# Patient Record
Sex: FEMALE | Race: WHITE
[De-identification: names, ages, dates, MRNs, and addresses within clinical notes are randomized per-mention and may not be internally consistent; named-entity substitution may affect disease eponyms.]

---

## 2018-07-12 ENCOUNTER — HOSPITAL ENCOUNTER (OUTPATIENT)
Dept: HOSPITAL 47 - LABPAT | Age: 58
Discharge: HOME | End: 2018-07-12
Attending: ORTHOPAEDIC SURGERY
Payer: COMMERCIAL

## 2018-07-12 DIAGNOSIS — Z01.812: Primary | ICD-10-CM

## 2018-07-12 LAB
ALBUMIN SERPL-MCNC: 4.4 G/DL (ref 3.5–5)
ALP SERPL-CCNC: 70 U/L (ref 38–126)
ALT SERPL-CCNC: 27 U/L (ref 9–52)
ANION GAP SERPL CALC-SCNC: 9 MMOL/L
APTT BLD: 22.7 SEC (ref 22–30)
AST SERPL-CCNC: 20 U/L (ref 14–36)
BUN SERPL-SCNC: 27 MG/DL (ref 7–17)
CALCIUM SPEC-MCNC: 10.1 MG/DL (ref 8.4–10.2)
CHLORIDE SERPL-SCNC: 106 MMOL/L (ref 98–107)
CO2 SERPL-SCNC: 27 MMOL/L (ref 22–30)
ERYTHROCYTE [DISTWIDTH] IN BLOOD BY AUTOMATED COUNT: 4.31 M/UL (ref 3.8–5.4)
ERYTHROCYTE [DISTWIDTH] IN BLOOD: 14.5 % (ref 11.5–15.5)
GLUCOSE SERPL-MCNC: 100 MG/DL (ref 74–99)
HCT VFR BLD AUTO: 40.7 % (ref 34–46)
HGB BLD-MCNC: 13.5 GM/DL (ref 11.4–16)
HYALINE CASTS UR QL AUTO: 1 /LPF (ref 0–2)
INR PPP: 1 (ref ?–1.2)
MCH RBC QN AUTO: 31.3 PG (ref 25–35)
MCHC RBC AUTO-ENTMCNC: 33.1 G/DL (ref 31–37)
MCV RBC AUTO: 94.4 FL (ref 80–100)
PH UR: 6 [PH] (ref 5–8)
PLATELET # BLD AUTO: 368 K/UL (ref 150–450)
POTASSIUM SERPL-SCNC: 5 MMOL/L (ref 3.5–5.1)
PROT SERPL-MCNC: 7.1 G/DL (ref 6.3–8.2)
PT BLD: 9.8 SEC (ref 9–12)
RBC UR QL: 1 /HPF (ref 0–5)
SODIUM SERPL-SCNC: 142 MMOL/L (ref 137–145)
SP GR UR: 1.02 (ref 1–1.03)
SQUAMOUS UR QL AUTO: 4 /HPF (ref 0–4)
UROBILINOGEN UR QL STRIP: <2 MG/DL (ref ?–2)
WBC # BLD AUTO: 8 K/UL (ref 3.8–10.6)
WBC #/AREA URNS HPF: 1 /HPF (ref 0–5)

## 2018-07-12 PROCEDURE — 87070 CULTURE OTHR SPECIMN AEROBIC: CPT

## 2018-07-12 PROCEDURE — 81001 URINALYSIS AUTO W/SCOPE: CPT

## 2018-07-12 PROCEDURE — 36415 COLL VENOUS BLD VENIPUNCTURE: CPT

## 2018-07-12 PROCEDURE — 85730 THROMBOPLASTIN TIME PARTIAL: CPT

## 2018-07-12 PROCEDURE — 85610 PROTHROMBIN TIME: CPT

## 2018-07-12 PROCEDURE — 85027 COMPLETE CBC AUTOMATED: CPT

## 2018-07-12 PROCEDURE — 80053 COMPREHEN METABOLIC PANEL: CPT

## 2018-07-24 ENCOUNTER — HOSPITAL ENCOUNTER (INPATIENT)
Dept: HOSPITAL 47 - 2ORMAIN | Age: 58
LOS: 1 days | Discharge: HOME HEALTH | DRG: 488 | End: 2018-07-25
Attending: ORTHOPAEDIC SURGERY | Admitting: ORTHOPAEDIC SURGERY
Payer: COMMERCIAL

## 2018-07-24 VITALS — BODY MASS INDEX: 37.6 KG/M2

## 2018-07-24 DIAGNOSIS — Z87.891: ICD-10-CM

## 2018-07-24 DIAGNOSIS — G89.29: ICD-10-CM

## 2018-07-24 DIAGNOSIS — T84.023A: ICD-10-CM

## 2018-07-24 DIAGNOSIS — K21.9: ICD-10-CM

## 2018-07-24 DIAGNOSIS — Z87.440: ICD-10-CM

## 2018-07-24 DIAGNOSIS — E55.9: ICD-10-CM

## 2018-07-24 DIAGNOSIS — M19.91: ICD-10-CM

## 2018-07-24 DIAGNOSIS — I10: ICD-10-CM

## 2018-07-24 DIAGNOSIS — Z79.899: ICD-10-CM

## 2018-07-24 DIAGNOSIS — Z80.6: ICD-10-CM

## 2018-07-24 DIAGNOSIS — F11.20: ICD-10-CM

## 2018-07-24 DIAGNOSIS — F41.1: ICD-10-CM

## 2018-07-24 DIAGNOSIS — Z96.651: ICD-10-CM

## 2018-07-24 DIAGNOSIS — M54.5: ICD-10-CM

## 2018-07-24 DIAGNOSIS — T84.063A: Primary | ICD-10-CM

## 2018-07-24 DIAGNOSIS — E78.5: ICD-10-CM
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
## 2018-07-24 DIAGNOSIS — K59.00: ICD-10-CM

## 2018-07-24 DIAGNOSIS — E66.01: ICD-10-CM

## 2018-07-24 PROCEDURE — 0SPD09Z REMOVAL OF LINER FROM LEFT KNEE JOINT, OPEN APPROACH: ICD-10-PCS

## 2018-07-24 PROCEDURE — 87070 CULTURE OTHR SPECIMN AEROBIC: CPT

## 2018-07-24 PROCEDURE — 87075 CULTR BACTERIA EXCEPT BLOOD: CPT

## 2018-07-24 PROCEDURE — 0SUW09Z SUPPLEMENT LEFT KNEE JOINT, TIBIAL SURFACE WITH LINER, OPEN APPROACH: ICD-10-PCS

## 2018-07-24 PROCEDURE — 85025 COMPLETE CBC W/AUTO DIFF WBC: CPT

## 2018-07-24 PROCEDURE — 87205 SMEAR GRAM STAIN: CPT

## 2018-07-24 RX ADMIN — POTASSIUM CHLORIDE SCH MLS: 14.9 INJECTION, SOLUTION INTRAVENOUS at 10:35

## 2018-07-24 RX ADMIN — ASPIRIN 325 MG ORAL TABLET SCH MG: 325 PILL ORAL at 20:21

## 2018-07-24 RX ADMIN — HYDROMORPHONE HYDROCHLORIDE PRN MG: 1 INJECTION, SOLUTION INTRAMUSCULAR; INTRAVENOUS; SUBCUTANEOUS at 13:45

## 2018-07-24 RX ADMIN — HYDROMORPHONE HYDROCHLORIDE PRN MG: 1 INJECTION, SOLUTION INTRAMUSCULAR; INTRAVENOUS; SUBCUTANEOUS at 20:22

## 2018-07-24 RX ADMIN — VARENICLINE SCH: 0.5 TABLET, FILM COATED ORAL at 17:17

## 2018-07-24 RX ADMIN — HYDROMORPHONE HYDROCHLORIDE PRN MG: 1 INJECTION, SOLUTION INTRAMUSCULAR; INTRAVENOUS; SUBCUTANEOUS at 14:04

## 2018-07-24 RX ADMIN — HYDROCODONE BITARTRATE AND ACETAMINOPHEN PRN EACH: 5; 325 TABLET ORAL at 15:27

## 2018-07-24 RX ADMIN — FENTANYL CITRATE ONE MCG: 50 INJECTION, SOLUTION INTRAMUSCULAR; INTRAVENOUS at 14:13

## 2018-07-24 RX ADMIN — FENTANYL CITRATE ONE MCG: 50 INJECTION, SOLUTION INTRAMUSCULAR; INTRAVENOUS at 14:14

## 2018-07-24 RX ADMIN — HYDROMORPHONE HYDROCHLORIDE PRN MG: 1 INJECTION, SOLUTION INTRAMUSCULAR; INTRAVENOUS; SUBCUTANEOUS at 17:13

## 2018-07-24 RX ADMIN — CEFAZOLIN SCH: 330 INJECTION, POWDER, FOR SOLUTION INTRAMUSCULAR; INTRAVENOUS at 15:39

## 2018-07-24 RX ADMIN — FENTANYL CITRATE ONE MCG: 50 INJECTION, SOLUTION INTRAMUSCULAR; INTRAVENOUS at 13:58

## 2018-07-24 RX ADMIN — POTASSIUM CHLORIDE SCH: 14.9 INJECTION, SOLUTION INTRAVENOUS at 16:31

## 2018-07-24 RX ADMIN — HYDROMORPHONE HYDROCHLORIDE PRN MG: 1 INJECTION, SOLUTION INTRAMUSCULAR; INTRAVENOUS; SUBCUTANEOUS at 13:37

## 2018-07-24 NOTE — P.ONQ
Anesthesiology Proc Note - PNB





- Peripheral Nerve Block Performed


  ** Left Adductor Canal Infusion


Time Out Performed: Yes (7210)


Procedure Start Time: 10:50


Procedure Stop Time: 11:05


Indication: Acute Post-Operative Pain, Dx/Pain Location (Left knee pain), 

Requested by physician


Sedation Type: Sedate with meaningful contact maintained


Preparation: Sterile Prep


Position: Supine


Catheter: Indwelling


Needle Types: Larkymarii


Needle Size: 100mm (4")


Needle Gauge: 21


Technique: Ultrasound


Injectate: 0.5% Ropivacaine (see comment for volume)


Blood Aspirated: No


Pain Paresthesia on Injection Noted: No


Resistance on Injection: Normal


Events: Uneventful and Well Tolerated

## 2018-07-24 NOTE — P.OP
Date of Procedure: 07/24/18


Preoperative Diagnosis: 


Polyethylene wear left total knee arthroplasty


Postoperative Diagnosis: 


Polyethylene wear left total knee arthroplasty


Procedure(s) Performed: 


Polyethylene exchange, revision left total knee arthroplasty


Implants: 


Yefri NexGen polyethylene, size green, 17 mm, cruciate retaining


Anesthesia: spinal


Surgeon: Catrachito Lee


Assistant #1: Natalia Woodson


Estimated Blood Loss (ml): 50


Pathology: other (Cultures 2)


Condition: stable


Disposition: PACU


Indications for Procedure: 


This is a 58-year-old female has had a left total knee arthroplasty performed 

approximately 15 years ago.  She has exam findings and x-ray findings 

consistent with polyethylene wear of her left total knee arthroplasty.  After 

discussing the surgical and nonsurgical treatment options with her at length, I 

recommended a revision left total knee arthroplasty with polyethylene exchange, 

possible complete revision.  Informed consent was obtained.


Operative Findings: 


The operative findings are consistent with severe polyethylene wear and 

instability of the left total knee arthroplasty


Description of Procedure: 


Patient was seen in the preoperative area consent was reviewed and operative 

site was marked with a skin marker.  An adductor canal pain catheter was placed 

by anesthesia in the preoperative area.  Patient was then brought to the 

operating room and given preoperative antibiotics intravenously. A spinal 

anesthetic was administered by the anesthesia department.  A tourniquet was 

placed on the upper thigh and the lower extremity was prepped and draped in 

usual sterile fashion.  A gram of transexamic acid was given.  A universal 

timeout was then performed which confirmed the patient's name, surgical site, 

ALLERGIES, and consent.





The lower extremity was then exsanguinated and tourniquet was inflated to 250 

mmHg.  A standard and anterior midline approach to the knee was performed.  The 

skin and subcutaneous tissue was dissected down to the patellar tendon, with 

the scar being excised.  A medial parapatellar arthrotomy was then performed.  

A large amount of clear fluid was encountered, and this was cultured 2.  The 

knee was then extended, the patellar was everted, and the knee was again 

flexed.  Scar tissue was excised from about the knee, and then the knee was 

then examined.





There is found to be significant medial instability on full extension and 

flexion to 90.  The polyethylene component was easily removed with a hemostat.

  The polyethylene component was inspected and found to have significant 

posterior wear.  The femoral, tibial, and patellar components were then 

inspected and found to be well fixed.





A 17 mm trial was then placed, which was one size up from the 14 mm component 

that was already in place.  The knee was then able to fully extend and flex to 

115, and instability was then gone.  The trial was then removed.





A Jonathan was then used to remove any reactive synovium.  The soft tissues were 

then injected with a ropivacaine solution, which consisted of 246.25 mg of 

ropivacaine, 0.5 mg of epinephrine, 30 mg of Toradol, 80 g of clonidine, and 

48.45 mL of sterile water, for a total of 100 mL of fluid injected. After the 

cemented hardened.  The tourniquet was released, and hemostasis was obtained.  

A second gram of transexamic acid was given.  The knee was again irrigated.  

The knee was again taken through range of motion and found to be stable 

throughout all range of motion of 0-130, and the patella tracked normally.  

The fascia was then closed with #2 strata fix suture.  The subcutaneous tissue 

was closed with 3-0 Vicryl and 3-0 strata fix.  Dermabond glue was used for the 

skin and placed with the knee in flexion. The patient was placed in a sterile 

silver dressing.  Patient was then transferred to recovery room in stable 

condition.





The assistant CAITLYN Forde was required due the complexity surgery and the 

need for a skilled surgical assistant.  She assisted in positioning, draping, 

retraction, and closure of the wound.

## 2018-07-24 NOTE — XR
EXAMINATION TYPE: XR knee limited LT

 

DATE OF EXAM: 7/24/2018

 

COMPARISON: NONE

 

TECHNIQUE: Two views submitted

 

HISTORY: Post op

 

FINDINGS:

There is a prosthetic  knee in near anatomic alignment.  There is soft tissue edema and emphysema. 

 

IMPRESSION:

1. Postoperative change.  Appears in near-anatomic alignment

## 2018-07-25 VITALS — RESPIRATION RATE: 16 BRPM

## 2018-07-25 VITALS — DIASTOLIC BLOOD PRESSURE: 67 MMHG | SYSTOLIC BLOOD PRESSURE: 117 MMHG | TEMPERATURE: 97.8 F

## 2018-07-25 VITALS — HEART RATE: 82 BPM

## 2018-07-25 LAB
BASOPHILS # BLD AUTO: 0 K/UL (ref 0–0.2)
BASOPHILS NFR BLD AUTO: 0 %
EOSINOPHIL # BLD AUTO: 0 K/UL (ref 0–0.7)
EOSINOPHIL NFR BLD AUTO: 0 %
ERYTHROCYTE [DISTWIDTH] IN BLOOD BY AUTOMATED COUNT: 3.83 M/UL (ref 3.8–5.4)
ERYTHROCYTE [DISTWIDTH] IN BLOOD: 14.1 % (ref 11.5–15.5)
HCT VFR BLD AUTO: 36 % (ref 34–46)
HGB BLD-MCNC: 11.3 GM/DL (ref 11.4–16)
LYMPHOCYTES # SPEC AUTO: 1.2 K/UL (ref 1–4.8)
LYMPHOCYTES NFR SPEC AUTO: 10 %
MCH RBC QN AUTO: 29.4 PG (ref 25–35)
MCHC RBC AUTO-ENTMCNC: 31.3 G/DL (ref 31–37)
MCV RBC AUTO: 94.1 FL (ref 80–100)
MONOCYTES # BLD AUTO: 0.5 K/UL (ref 0–1)
MONOCYTES NFR BLD AUTO: 5 %
NEUTROPHILS # BLD AUTO: 9.6 K/UL (ref 1.3–7.7)
NEUTROPHILS NFR BLD AUTO: 84 %
PLATELET # BLD AUTO: 385 K/UL (ref 150–450)
WBC # BLD AUTO: 11.4 K/UL (ref 3.8–10.6)

## 2018-07-25 RX ADMIN — HYDROCODONE BITARTRATE AND ACETAMINOPHEN PRN EACH: 5; 325 TABLET ORAL at 00:39

## 2018-07-25 RX ADMIN — ASPIRIN 325 MG ORAL TABLET SCH MG: 325 PILL ORAL at 09:39

## 2018-07-25 RX ADMIN — VARENICLINE SCH MG: 0.5 TABLET, FILM COATED ORAL at 09:39

## 2018-07-25 RX ADMIN — CEFAZOLIN SCH: 330 INJECTION, POWDER, FOR SOLUTION INTRAMUSCULAR; INTRAVENOUS at 04:29

## 2018-07-25 RX ADMIN — HYDROMORPHONE HYDROCHLORIDE PRN MG: 1 INJECTION, SOLUTION INTRAMUSCULAR; INTRAVENOUS; SUBCUTANEOUS at 05:36

## 2018-07-25 RX ADMIN — HYDROCODONE BITARTRATE AND ACETAMINOPHEN PRN EACH: 5; 325 TABLET ORAL at 06:53

## 2018-07-25 NOTE — P.DS
Providers


Date of admission: 


07/24/18 08:53





Expected date of discharge: 07/25/18


Attending physician: 


Catrachito Lee





Consults: 





 





07/24/18 11:28


Consult Physician Routine 


   Consulting Provider: Tabitha Aguiar


   Consult Reason/Comments: medical management


   Do you want consulting provider notified?: Yes





07/24/18 16:20


Consult Physician Routine 


   Consulting Provider: Basim Stewart


   Consult Reason/Comments: medical management


   Do you want consulting provider notified?: Yes











Primary care physician: 


Tabitha Aguair








- Discharge Diagnosis(es)


(1) Failed total left knee replacement


Current Visit: Yes   Status: Acute   





(2) Status post revision of total replacement of left knee


Current Visit: Yes   Status: Acute   


Hospital Course: 


This is a 58-year-old female with known history of polyethylene wear of her 

left total knee arthroplasty that was done ~15 years ago.  The patient presents 

for evaluation.  After discussion and consideration patient elects to proceed 

with revision left total knee arthroplasty.  The patient is seen preoperatively 

by Dr. Lee and medically cleared for surgery by their primary care 

physician.





Patient is admitted to [facility] on 07/24/2018 for revision left 

total knee arthroplasty.  The procedures performed without complication or 

sequelae.  The patient is doing well postoperatively.  Labs and vital signs are 

stable on day of discharge.





On day of discharge patient's knee incision is healing well.  There is minimal 

erythema.  There is no drainage noted at this time.  There is minimal soft 

tissue swelling to the knee.  Patient has full foot and ankle motion without 

difficulty or pain.  Neurovascular status to the left lower extremity is 

intact.  Patient is discharged home in good condition. Please see med rec for 

accurate list of home medications.








Plan - Discharge Summary


Discharge Rx Participant: Yes


New Discharge Prescriptions: 


New


   Aspirin 325 mg PO BID #60 tab


   HYDROcodone/APAP 5-325MG [Norco 5-325] 1 - 2 tab PO Q4-6H PRN #84 tab


     PRN Reason: Pain


   Sennosides [Senokot] 1 tab PO BID #60 tablet





No Action


   amLODIPine [Norvasc] 10 mg PO DAILY


   Varenicline [Chantix Starter Pack] 0.5 mg PO DAILY


   Omeprazole [PriLOSEC] 20 mg PO AC-BRKFST


   HYDROcodone/APAP 5-325MG [Norco 5-325] 1 tab PO Q6HR PRN


     PRN Reason: Pain


   ALPRAZolam [Xanax] 0.5 mg PO DAILY PRN


     PRN Reason: Anxiety


   Simvastatin [Zocor] 20 mg PO DAILY


   Ibuprofen [Motrin] 800 mg PO TID PRN


     PRN Reason: Pain


Discharge Medication List





ALPRAZolam [Xanax] 0.5 mg PO DAILY PRN 07/17/18 [History]


HYDROcodone/APAP 5-325MG [Norco 5-325] 1 tab PO Q6HR PRN 07/17/18 [History]


Ibuprofen [Motrin] 800 mg PO TID PRN 07/17/18 [History]


Omeprazole [PriLOSEC] 20 mg PO AC-BRKFST 07/17/18 [History]


Simvastatin [Zocor] 20 mg PO DAILY 07/17/18 [History]


Varenicline [Chantix Starter Pack] 0.5 mg PO DAILY 07/17/18 [History]


amLODIPine [Norvasc] 10 mg PO DAILY 07/17/18 [History]


Aspirin 325 mg PO BID #60 tab 07/25/18 [Rx]


HYDROcodone/APAP 5-325MG [Norco 5-325] 1 - 2 tab PO Q4-6H PRN #84 tab 07/25/18 [

Rx]


Sennosides [Senokot] 1 tab PO BID #60 tablet 07/25/18 [Rx]








Follow up Appointment(s)/Referral(s): 


Catrachito Lee DO [Doctor of Osteopathic Medicine] - 2 Weeks


Ambulatory/Diagnostic Orders: 


Continuous Passive Motion (CPM) Machine [DME.AMB1] Time Frame: 3 Weeks, Location

: None Selected


Activity/Diet/Wound Care/Special Instructions: 


Weightbearing as tolerated with a walker


CPM 5-6h daily


Leave dressing intact.  May be removed by home care nurse in 10 days.


May shower with dressing on.


Please call [facility] with any questions or concerns, [phone]





Discharge Disposition: HOME WITH HOME HEALTH SERVICES

## 2018-07-25 NOTE — P.PN
Progress Note - Text


Progress Note Date: 07/25/18


The patient is doing well status post total knee replacement.  Pain is well 

controlled by a combination of local anesthetic infusion through the adductor 

canal catheter and oral analgesics.  There are no signs of infection around the 

catheter skin entry site.


The local anesthetic infusion will be continued as per protocol.

## 2018-07-25 NOTE — PN
PROGRESS NOTE



DATE OF SERVICE:

07/25/2018



INTERVAL HISTORY:

This is a 58-year-old woman who was admitted after revision of the left total knee

arthroplasty, is improving significantly.  No chest pain.  No palpitations.  No fever.



PHYSICAL EXAM:

Alert and oriented x3. Pulse 52, blood pressure 170/67, respirations 16, temperature

97.8, pulse ox 98% on room air.

HEENT:  Conjunctivae normal.

NECK:  No jugular venous distention.

CARDIOVASCULAR:  S1, S2, muffled.

RESPIRATORY:  Breath sounds diminished at the bases, no rhonchi, no crackles.

ABDOMEN:  Soft.

LEGS:  Status post surgery.

NERVOUS SYSTEM: No focal deficit.



LABS:

WBC 11.4.



ASSESSMENT:

1. Status post revision of the left total knee arthroplasty with polyethylene

    exchange.

2. Increased WBC, possibly reactive.

3. History of degenerative joint disease.

4. Gastroesophageal reflux disease.

5. Hypertension.

6. Hyperlipidemia.

7. History of back surgery.

8. Benign breast tumors history.

9. History of anxiety.

10.Remote history of nicotine dependence.



RECOMMENDATION:

Recommend to continue current management and symptomatic treatment.  Otherwise,

incentive spirometry. Recommend close follow up in the outpatient setting with primary

physician in the outpatient setting and rest of the recommendations from Orthopedic

Surgery, DVT prophylaxis.





MMODL / IJN: [id number] / Job#: [id number]

## 2018-07-31 NOTE — CDI
Last Revision, December 2017





Documentation Clarification Form



Date: 7/31/2018 12:00:00 AM

From: Haleigh Sorensen RN, CCDS

Phone: [phone]

MRN: [id number]

Admit Date: 7/24/2018 8:53:00 AM

Patient Name: Radha Arriaza

Visit Number: [id number]

Discharge Date:



ATTENTION: The Clinical Documentation Specialists (CDI) and [facility] Coding Staff 
appreciate your assistance in clarifying documentation. Please respond to the 
clarification below the line at the bottom and electronically sign. The CDI & 
[facility] Coding staff will review the response and follow-up if needed. Please note: 
Queries are made part of the Legal Health Record. If you have any questions, 
please contact the author of this message via ITS.



Dr. Catrachito Lee



Patient admitted for an elective procedure related to a diagnosis of 
polyethylene wear left total knee arthroplasty



Patient history/risk factors: Left total knee arthroplasty 15 years ago



Clinical Indicators: She present with exam and x-ray findings consistent with 
polyethylene wear of her left total knee arthroplasty and after failure on 
nonsurgical treatment she present for a revision.



Treatment:

Polyethylene exchange, revision left total knee arthroplasty





In your professional opinion, in order to accurately code the procedure  can 
you please clarify replacement of knee liner (Root operations and removal and 
supplement)?



Femoral surface

Patellar surface

Tibial insert

Other, Specified (e.g. unicondylar)



Please continue to document in the discharge summary in order to capture 
severity of illness and risk of mortality. Include clinical findings that 
support your diagnosis.

___________________________________________________________
MTDD

## 2018-08-07 NOTE — CDI
Last Revision, December 2017





Documentation Clarification Form



Date: 7/31/2018 12:00:00 AM

From: Haleigh Sorensen RN, CCDS

Phone: [phone]

MRN: [id number]

Admit Date: 7/24/2018 8:53:00 AM

Patient Name: Radha Arriaza

Visit Number: [id number]

Discharge Date:



ATTENTION: The Clinical Documentation Specialists (CDI) and [facility] Coding Staff 
appreciate your assistance in clarifying documentation. Please respond to the 
clarification below the line at the bottom and electronically sign. The CDI & 
[facility] Coding staff will review the response and follow-up if needed. Please note: 
Queries are made part of the Legal Health Record. If you have any questions, 
please contact the author of this message via ITS.



Catrachito Hebert , 







Patient admitted for an elective procedure related to a diagnosis of 
polyethylene wear left total knee arthroplasty



Patient history/risk factors: Left total knee Arthroplasty 15 years ago



Clinical Indicators: She present with exam and x-ray findings consistent with 
polyethylene wear of her left total knee arthroplasty and after failure on 
nonsurgical treatment she present for a revision.



Treatment:

Polyethylene exchange, revision left total knee arthroplasty





In your professional opinion, in order to accurately code the procedure  can 
you please clarify replacement of knee liner (Root operations and removal and 
supplement)?



Femoral surface

Patellar surface

Tibial insert

Other, Specified (e.g. unicondylar)



Please continue to document in your progress notes and discharge summary in 
order to capture severity of illness and risk of mortality. Include clinical 
findings that support your diagnosis.

__________________________________________________

MTDD

## 2021-07-30 NOTE — CDI
Last Revision, December 2017





Documentation Clarification Form



Date: 8/6/18

From: Haleigh Sorensen

Phone: [phone] Gladis Cortes,  Hours-8:30 am & 5 pm M-F

MRN: [id number]

Admit Date: 7/24/2018 8:53:00 AM

Patient Name: Radha Arriaza

Visit Number: [id number]

Discharge Date: 7/25/18



ATTENTION: The Clinical Documentation Specialists (CDI) and [facility] Coding Staff 
appreciate your assistance in clarifying documentation. Please respond to the 
clarification below the line at the bottom and electronically sign. The CDI & 
[facility] Coding staff will review the response and follow-up if needed. Please note: 
Queries are made part of the Legal Health Record. If you have any questions, 
please contact the author of this message via ITS.



Catrachito Hebert , 



Patient admitted for an elective procedure related to a diagnosis of 
polyethylene wear left total knee arthroplasty



Patient history/risk factors: Left total knee Arthroplasty 15 years ago



Clinical Indicators: She present with exam and x-ray findings consistent with 
polyethylene wear of her left total knee arthroplasty and after failure on 
nonsurgical treatment she present for a revision.



Treatment:

Polyethylene exchange, revision left total knee arthroplasty



In your professional opinion, in order to accurately code the procedure  can 
you please clarify replacement of knee liner (Root operations and removal and 
supplement)?



Femoral surface

Patellar surface

Tibial insert

Other, Specified (e.g. unicondylar)



Please continue to document in your progress notes and discharge summary in 
order to capture severity of illness and risk of mortality. Include clinical 
findings that support your diagnosis.

____________________________________________
MTDD [employer] in

## 2022-05-27 ENCOUNTER — HOSPITAL ENCOUNTER (OUTPATIENT)
Dept: HOSPITAL 47 - ORWHC2ENDO | Age: 62
Discharge: HOME | End: 2022-05-27
Attending: INTERNAL MEDICINE
Payer: COMMERCIAL

## 2022-05-27 VITALS — TEMPERATURE: 97.5 F

## 2022-05-27 VITALS — SYSTOLIC BLOOD PRESSURE: 125 MMHG | DIASTOLIC BLOOD PRESSURE: 80 MMHG | HEART RATE: 78 BPM | RESPIRATION RATE: 18 BRPM

## 2022-05-27 VITALS — BODY MASS INDEX: 30.9 KG/M2

## 2022-05-27 DIAGNOSIS — K21.9: ICD-10-CM

## 2022-05-27 DIAGNOSIS — E78.5: ICD-10-CM

## 2022-05-27 DIAGNOSIS — Z79.891: ICD-10-CM

## 2022-05-27 DIAGNOSIS — D12.4: ICD-10-CM

## 2022-05-27 DIAGNOSIS — D12.3: ICD-10-CM

## 2022-05-27 DIAGNOSIS — M19.90: ICD-10-CM

## 2022-05-27 DIAGNOSIS — I10: ICD-10-CM

## 2022-05-27 DIAGNOSIS — Z79.899: ICD-10-CM

## 2022-05-27 DIAGNOSIS — F17.210: ICD-10-CM

## 2022-05-27 DIAGNOSIS — D17.79: ICD-10-CM

## 2022-05-27 DIAGNOSIS — Z12.11: Primary | ICD-10-CM

## 2022-05-27 DIAGNOSIS — Z86.010: ICD-10-CM

## 2022-05-27 PROCEDURE — 45385 COLONOSCOPY W/LESION REMOVAL: CPT

## 2022-05-27 PROCEDURE — 88305 TISSUE EXAM BY PATHOLOGIST: CPT

## 2022-05-27 PROCEDURE — 45380 COLONOSCOPY AND BIOPSY: CPT

## 2022-05-27 NOTE — P.PCN
Date of Procedure: 05/27/22


Procedure(s) Performed: 


BRIEF HISTORY: Patient is a 62-year-old pleasant white female  scheduled for an 

elective colonoscopy as a part of evaluation of prior history of colon polyps.  

Last colonoscopy was  5 years ago.





PROCEDURE PERFORMED: Colonoscopy with snare polypectomy . 





PREOPERATIVE DIAGNOSIS: . history of colon polyps  





IV sedation per Anesthesia. 





PROCEDURE: After informed consent was obtained, the patient, was brought into 

the endoscopy unit. IV sedation was administered by Anesthesia under continuous 

monitoring.  Digital rectal examination was normal. Initially the Olympus CF-160

flexible video colonoscope was then inserted in the rectum, gradually advanced 

into the cecum without any difficulty. Careful examination was performed as the 

scope was gradually being withdrawn. Ileocecal valve and the appendiceal orifice

were visualized and appeared normal.  Prep was excellent. Mucosa of the cecum,he

normal.  In the ascending colon there was a 5 mm polyp removed by snare 

polypectomy.  In the proximal rectum there was a large lipoma noted and adjacent

to this area there was a 2 cm lipoma seen which was biopsied.  In the mid 

transverse colon there was a 1.5 cm polyp that was removed by snare polypectomy.

 In the descending colon there was a 5 mm polyp removed by snare polypectomy.  

rest of the transverse colon, descending colon, sigmoid colon, and rectum 

appeared normal. Retroflexion was performed in the rectum and no lesions were 

seen. The patient tolerated the procedure well. 





IMPRESSION: 


5 mm ascending colon polyp status post snare polypectomy 


1.5 cm mid transverse colon polyp status post polypectomy


2 cm proximal transverse colon submucosal polyp possibly a lipoma status post 

biopsy


5 mm descending colon polyp status post snare polypectomy





RECOMMENDATIONS:  Findings of this examination were discussed with the patient 

as well as a family.  She was advised to follow up biopsy results.  If the 

biopsy result adenoma she can have a repeat colonoscopy in 3 years.